# Patient Record
Sex: FEMALE | Race: BLACK OR AFRICAN AMERICAN | Employment: FULL TIME | URBAN - METROPOLITAN AREA
[De-identification: names, ages, dates, MRNs, and addresses within clinical notes are randomized per-mention and may not be internally consistent; named-entity substitution may affect disease eponyms.]

---

## 2019-02-15 ENCOUNTER — OFFICE VISIT (OUTPATIENT)
Dept: ORTHOPEDIC SURGERY | Age: 34
End: 2019-02-15

## 2019-02-15 VITALS
DIASTOLIC BLOOD PRESSURE: 73 MMHG | HEART RATE: 55 BPM | SYSTOLIC BLOOD PRESSURE: 139 MMHG | TEMPERATURE: 98.3 F | WEIGHT: 194 LBS | BODY MASS INDEX: 31.31 KG/M2 | RESPIRATION RATE: 18 BRPM

## 2019-02-15 DIAGNOSIS — M51.37 DEGENERATION OF LUMBAR OR LUMBOSACRAL INTERVERTEBRAL DISC: ICD-10-CM

## 2019-02-15 DIAGNOSIS — M51.26 DISPLACEMENT OF LUMBAR INTERVERTEBRAL DISC WITHOUT MYELOPATHY: ICD-10-CM

## 2019-02-15 DIAGNOSIS — M47.817 LUMBOSACRAL SPONDYLOSIS WITHOUT MYELOPATHY: ICD-10-CM

## 2019-02-15 DIAGNOSIS — M54.50 LOW BACK PAIN AT MULTIPLE SITES: Primary | ICD-10-CM

## 2019-02-15 RX ORDER — NAPROXEN 500 MG/1
500 TABLET ORAL 2 TIMES DAILY WITH MEALS
Qty: 30 TAB | Refills: 0 | Status: SHIPPED | OUTPATIENT
Start: 2019-02-15 | End: 2021-02-18

## 2019-02-15 RX ORDER — METHYLPREDNISOLONE 4 MG/1
TABLET ORAL
Qty: 1 DOSE PACK | Refills: 0 | Status: SHIPPED | OUTPATIENT
Start: 2019-02-15 | End: 2021-02-18

## 2019-02-15 NOTE — PROGRESS NOTES
[facility]  [address], [address]  Phone: [phone]  Fax: [phone]        INITIAL CONSULTATION      HISTORY OF PRESENT ILLNESS:  Jason Davila is a 34 y.o. female whom is self-referred secondary to intermittent low back pain extending into the RLE in an L4 distribution to the knee. She rates her pain 3-8/10. Pain is exacerbated with prolonged positions. Denies specific injury or trauma. Ho hx of spinal surgery or injections. She treats with NSAIDs prn with some relief. She reports a hx of PT and she is not completing her HEP. Pt denies fever, weight loss, or skin changes. Pt denies change in bowel or bladder habits. She denies possibility of pregnancy or breastfeeding (birth control). Last seen by me 7/2015 with c/o left-sided low back pain and right hip pain. Had an MRI of the right hip. Scheduled to f/u in 1 month and failed to do so. Lumbar spine MRI dated 6/29/15 reviewed. Per report, There is a mild anterolisthesis of L5-S1 with broad-based disc protrusion, central annular tear and subtle caudal extrusion as a result of the listhesis but without significant impression on the ventral thecal sac or canal stenosis. Moderate bilateral foraminal stenosis from foraminal portions of the protrusion. Focal central tear and extrusion at L4-5 with impression on the ventral thecal sac and mild canal stenosis. Exit foramina are patent. Remaining levels are normal.  No cord lesions are seen. .      The patient is RHD.  reviewed. Body mass index is 31.31 kg/m². PCP: Nikhil Omer MD    Past Medical History:   Diagnosis Date    Allergic rhinitis     HPV (human papilloma virus) infection     Migraines         History reviewed. No pertinent surgical history. Social History     Tobacco Use    Smoking status: Never Smoker   Substance Use Topics    Alcohol use: Yes     Comment: socially     Work status: The patient is employed.   Marital status: The patient is single. Current Outpatient Medications   Medication Sig Dispense Refill    ibuprofen (MOTRIN) 600 mg tablet Take 1 Tab by mouth every six (6) hours as needed for Pain. 20 Tab 0    HYDROcodone-acetaminophen (NORCO) 5-325 mg per tablet Take 1 Tab by mouth every four (4) hours as needed for Pain. Max Daily Amount: 6 Tabs. 12 Tab 0    ethinyl estradiol-etonogestrel (NUVARING) 0.12-0.015 mg/24 hr vaginal ring by Intravaginally route. Allergies   Allergen Reactions    Fish Containing Products Hives and Swelling            Family History   Problem Relation Age of Onset   26 [facility] Cancer Mother     Hypertension Mother     Deep Vein Thrombosis Other          REVIEW OF SYSTEMS  Constitutional symptoms: Negative  Eyes: Negative  Ears, Nose, Throat, and Mouth: Negative  Cardiovascular: Negative  Respiratory: Negative  Genitourinary: Negative  Integumentary (Skin and/or breast): Negative  Musculoskeletal: Positive for low back pain RLE pain  Extremities: Negative for edema. Endocrine/Rheumatologic: Negative  Hematologic/Lymphatic: Negative  Allergic/Immunologic: Negative  Psychiatric: Negative       PHYSICAL EXAMINATION  Visit Vitals  /73   Pulse (!) 55   Temp 98.3 °F (36.8 °C)   Resp 18   Wt 194 lb (88 kg)   BMI 31.31 kg/m²       CONSTITUTIONAL: NAD, A&O x 3  HEART: Regular rate and rhythm  ABDOMEN: Positive bowel sounds, soft, nontender, and nondistended  LUNGS: Clear to auscultation bilaterally. SKIN: Negative for rash. RANGE OF MOTION: The patient has full passive range of motion in all four extremities. SENSATION: Sensation is intact to light touch throughout. MOTOR:   Straight Leg Raise: Negative, bilateral  Valenzuela: Negative, bilateral  Deep tendon reflexes are 1+ at the brachioradialis, biceps, and triceps bilaterally  Deep tendon reflexes are 0 at the knees and ankles bilaterally.      Shoulder AB/Flex Elbow Flex Wrist Ext Elbow Ext Wrist Flex Hand Intrin Tone   Right +4/5 +4/5 +4/5 +4/5 +4/5 +4/5 +4/5   Left +4/5 +4/5 +4/5 +4/5 +4/5 +4/5 +4/5              Hip Flex Knee Ext Knee Flex Ankle DF GTE Ankle PF Tone   Right +4/5 +4/5 +4/5 +4/5 +4/5 +4/5 +4/5   Left +4/5 +4/5 +4/5 +4/5 +4/5 +4/5 +4/5     RADIOGRAPHS  Preliminary reading of lumbar plain films:  Grade I anterolisthesis of L5 on S1. Questionable Pars defect on the right at L5. nodefinite Pars defect on the left. Age appropriate degenerative changes. No acute pathology identified. No gross instability noted on flexion extension. These are being sent out for official reading by Dr. Yeni Kieth. ASSESSMENT   Diagnoses and all orders for this visit:    1. Low back pain at multiple sites  -     AMB POC XRAY, SPINE, LUMBOSACRAL; 2 O  -     AMB POC XRAY, SPINE, LUMBOSACRAL; 4+    2. Displacement of lumbar intervertebral disc without myelopathy    3. Lumbosacral spondylosis without myelopathy    4. Degeneration of lumbar or lumbosacral intervertebral disc           IMPRESSIONS/RECOMMENDATIONS:  Patient presents today with c/o intermittent low back pain extending into the RLE in an L4 distribution to the knee. I will refer her to physical therapy with an emphasis on HEP. I will start her on Medrol Dosepak. I gave her a prescription for Naproxen following completion of the Medrol Dosepak. I will see the patient back in 1 month's time or earlier if needed. Written by Miles Alves, as dictated by Briseida Chavez MD  I examined the patient, reviewed and agree with the note.

## 2019-02-20 ENCOUNTER — TELEPHONE (OUTPATIENT)
Dept: ORTHOPEDIC SURGERY | Age: 34
End: 2019-02-20

## 2019-02-20 NOTE — TELEPHONE ENCOUNTER
Patient called stating her prescriptions naproxen (NAPROSYN) 500 mg tablet and methylPREDNISolone (MEDROL DOSEPACK) 4 mg tablet were supposed to be sent to the [facility] on [address] in [place]. Please re-send medications to the correct pharmacy and advise patient when completed at [phone].

## 2019-02-26 NOTE — TELEPHONE ENCOUNTER
[facility] to cancel the previous prescriptions, however, the patient had already picked them up.

## 2019-02-27 ENCOUNTER — OFFICE VISIT (OUTPATIENT)
Dept: ORTHOPEDIC SURGERY | Age: 34
End: 2019-02-27

## 2019-02-27 VITALS
BODY MASS INDEX: 30.67 KG/M2 | DIASTOLIC BLOOD PRESSURE: 80 MMHG | WEIGHT: 190.8 LBS | RESPIRATION RATE: 18 BRPM | HEIGHT: 66 IN | HEART RATE: 86 BPM | TEMPERATURE: 98.8 F | SYSTOLIC BLOOD PRESSURE: 135 MMHG | OXYGEN SATURATION: 100 %

## 2019-02-27 DIAGNOSIS — M47.817 LUMBOSACRAL SPONDYLOSIS WITHOUT MYELOPATHY: ICD-10-CM

## 2019-02-27 DIAGNOSIS — M51.37 DEGENERATION OF LUMBAR OR LUMBOSACRAL INTERVERTEBRAL DISC: ICD-10-CM

## 2019-02-27 DIAGNOSIS — M51.26 DISPLACEMENT OF LUMBAR INTERVERTEBRAL DISC WITHOUT MYELOPATHY: Primary | ICD-10-CM

## 2019-02-27 RX ORDER — DULOXETIN HYDROCHLORIDE 30 MG/1
30 CAPSULE, DELAYED RELEASE ORAL DAILY
Qty: 30 CAP | Refills: 1 | Status: SHIPPED | OUTPATIENT
Start: 2019-02-27 | End: 2021-02-18

## 2019-02-27 NOTE — PROGRESS NOTES
[facility]  [address], [address]  Phone: [phone]  Fax: [phone]        PROGRESS NOTE      HISTORY OF PRESENT ILLNESS:  The patient is a 34 y.o. female and was seen today for follow up of intermittent low back pain extending into the RLE in an L4 distribution to the knee. Pain is exacerbated with prolonged positions. Denies specific injury or trauma. Ho hx of spinal surgery or injections. She treats with NSAIDs prn with some relief. She reports a hx of PT and she is not completing her HEP. Pt denies fever, weight loss, or skin changes. Pt denies change in bowel or bladder habits. She denies possibility of pregnancy or breastfeeding (birth control). The patient is RHD. Last seen by me 7/2015 with c/o left-sided low back pain and right hip pain. Had an MRI of the right hip. Scheduled to f/u in 1 month and failed to do so. Lumbar spine MRI dated 6/29/15 reviewed. Per report, There is a mild anterolisthesis of L5-S1 with broad-based disc protrusion, central annular tear and subtle caudal extrusion as a result of the listhesis but without significant impression on the ventral thecal sac or canal stenosis. Moderate bilateral foraminal stenosis from foraminal portions of the protrusion. Focal central tear and extrusion at L4-5 with impression on the ventral thecal sac and mild canal stenosis. Exit foramina are patent. Remaining levels are normal.  No cord lesions are seen. Preliminary reading of lumbar plain films revealed: Grade I anterolisthesis of L5 on S1. Questionable Pars defect on the right at L5. nodefinite Pars defect on the left. Age appropriate degenerative changes. No acute pathology identified. No gross instability noted on flexion extension. At her last clinical appointment, patient presented with c/o intermittent low back pain extending into the RLE in an L4 distribution to the knee. I referred her to physical therapy with an emphasis on HEP.  I started her on Medrol Dosepak. I gave her a prescription for Naproxen following completion of the Medrol Dosepak. The patient returns today with c/o intermittent low back pain extending into the BLE in an L4 distribution to the knees. She rates her pain 8/10, previously 3-8/10. She just started MDP 3 days ago due to a delay at her pharmacy. She is scheduled to start PT tomorrow. Pt denies change in bowel or bladder habits.  reviewed. Body mass index is 30.8 kg/m².       PCP: Carolyn Vides MD      Past Medical History:   Diagnosis Date    Allergic rhinitis     HPV (human papilloma virus) infection     Migraines         Social History     Socioeconomic History    Marital status: SINGLE     Spouse name: Not on file    Number of children: Not on file    Years of education: Not on file    Highest education level: Not on file   Social Needs    Financial resource strain: Not on file    Food insecurity - worry: Not on file    Food insecurity - inability: Not on file    Transportation needs - medical: Not on file   [facility] needs - non-medical: Not on file   Occupational History    Not on file   Tobacco Use    Smoking status: Never Smoker    Smokeless tobacco: Never Used   Substance and Sexual Activity    Alcohol use: Yes     Comment: socially    Drug use: No    Sexual activity: Yes     Partners: Male   Other Topics Concern     Service Not Asked    Blood Transfusions Not Asked    Caffeine Concern Not Asked    Occupational Exposure Not Asked    Hobby Hazards Not Asked    Sleep Concern Not Asked    Stress Concern Not Asked    Weight Concern Not Asked    Special Diet Not Asked    Back Care Not Asked    Exercise Not Asked    Bike Helmet Not Asked    Seat Belt Not Asked    Self-Exams Not Asked   Social History Narrative    Not on file       Current Outpatient Medications   Medication Sig Dispense Refill    methylPREDNISolone (MEDROL DOSEPACK) 4 mg tablet Per dose pack instructions 1 Dose Pack 0    ethinyl estradiol-etonogestrel (NUVARING) 0.12-0.015 mg/24 hr vaginal ring by Intravaginally route.  naproxen (NAPROSYN) 500 mg tablet Take 1 Tab by mouth two (2) times daily (with meals). 30 Tab 0    ibuprofen (MOTRIN) 600 mg tablet Take 1 Tab by mouth every six (6) hours as needed for Pain. 20 Tab 0    HYDROcodone-acetaminophen (NORCO) 5-325 mg per tablet Take 1 Tab by mouth every four (4) hours as needed for Pain. Max Daily Amount: 6 Tabs. 12 Tab 0       Allergies   Allergen Reactions    Fish Containing Products Hives and Swelling          PHYSICAL EXAMINATION    Visit Vitals  /80   Pulse 86   Temp 98.8 °F (37.1 °C) (Oral)   Resp 18   Ht 5' 6\" (1.676 m)   Wt 190 lb 12.8 oz (86.5 kg)   SpO2 100%   BMI 30.80 kg/m²       CONSTITUTIONAL: NAD, A&O x 3  SENSATION: Intact to light touch throughout  RANGE OF MOTION: The patient has full passive range of motion in all four extremities. MOTOR:  Straight Leg Raise: Negative, bilateral               Hip Flex Knee Ext Knee Flex Ankle DF GTE Ankle PF Tone   Right +4/5 +4/5 +4/5 +4/5 +4/5 +4/5 +4/5   Left +4/5 +4/5 +4/5 +4/5 +4/5 +4/5 +4/5       ASSESSMENT   Diagnoses and all orders for this visit:    1. Displacement of lumbar intervertebral disc without myelopathy    2. Degeneration of lumbar or lumbosacral intervertebral disc    3. Lumbosacral spondylosis without myelopathy    Other orders  -     DULoxetine (CYMBALTA) 30 mg capsule; Take 1 Cap by mouth daily.  -     SCHEDULE SURGERY          IMPRESSION AND PLAN:   Patient presents today with c/o intermittent low back pain extending into the BLE in an L4 distribution to the knees. Patient wishes to proceed with block at this time. I will order L3-4 epidural. I have instructed patient not to take Naproxen following MDP. I will try her on Cymbalta 30 mg daily. The risks, benefits, and potential side effects of this medication were discussed.  Patient understands and wishes to proceed. Patient advised to call the office if intolerant to new medication. Patient is not interested in surgical intervention at this time. Patient is neurologically intact. I will see the patient back following blocks. Written by Edith Price, as dictated by Caridad Garcia MD  I examined the patient, reviewed and agree with the note.

## 2019-02-28 ENCOUNTER — APPOINTMENT (OUTPATIENT)
Dept: GENERAL RADIOLOGY | Age: 34
End: 2019-02-28
Attending: PHYSICAL MEDICINE & REHABILITATION
Payer: COMMERCIAL

## 2019-02-28 ENCOUNTER — HOSPITAL ENCOUNTER (OUTPATIENT)
Age: 34
Setting detail: OUTPATIENT SURGERY
Discharge: HOME | End: 2019-02-28
Attending: PHYSICAL MEDICINE & REHABILITATION | Admitting: PHYSICAL MEDICINE & REHABILITATION
Payer: COMMERCIAL

## 2019-02-28 ENCOUNTER — APPOINTMENT (OUTPATIENT)
Dept: PHYSICAL THERAPY | Age: 34
End: 2019-02-28

## 2019-02-28 VITALS
DIASTOLIC BLOOD PRESSURE: 85 MMHG | TEMPERATURE: 98 F | OXYGEN SATURATION: 98 % | BODY MASS INDEX: 30.53 KG/M2 | RESPIRATION RATE: 18 BRPM | WEIGHT: 190 LBS | HEART RATE: 72 BPM | HEIGHT: 66 IN | SYSTOLIC BLOOD PRESSURE: 127 MMHG

## 2019-02-28 LAB — HCG UR QL: NEGATIVE

## 2019-02-28 PROCEDURE — 81025 URINE PREGNANCY TEST: CPT

## 2019-02-28 PROCEDURE — 76010000009 HC PAIN MGT 0 TO 30 MIN PROC: Performed by: PHYSICAL MEDICINE & REHABILITATION

## 2019-02-28 PROCEDURE — 74011250636 HC RX REV CODE- 250/636: Performed by: PHYSICAL MEDICINE & REHABILITATION

## 2019-02-28 PROCEDURE — 74011250637 HC RX REV CODE- 250/637: Performed by: PHYSICAL MEDICINE & REHABILITATION

## 2019-02-28 PROCEDURE — 77030014124 HC TY EPDRL BBMI -A: Performed by: PHYSICAL MEDICINE & REHABILITATION

## 2019-02-28 RX ORDER — LIDOCAINE HYDROCHLORIDE 10 MG/ML
INJECTION, SOLUTION EPIDURAL; INFILTRATION; INTRACAUDAL; PERINEURAL AS NEEDED
Status: DISCONTINUED | OUTPATIENT
Start: 2019-02-28 | End: 2019-02-28 | Stop reason: HOSPADM

## 2019-02-28 RX ORDER — DIAZEPAM 5 MG/1
5-20 TABLET ORAL ONCE
Status: COMPLETED | OUTPATIENT
Start: 2019-02-28 | End: 2019-02-28

## 2019-02-28 RX ORDER — DEXAMETHASONE SODIUM PHOSPHATE 100 MG/10ML
INJECTION INTRAMUSCULAR; INTRAVENOUS AS NEEDED
Status: DISCONTINUED | OUTPATIENT
Start: 2019-02-28 | End: 2019-02-28 | Stop reason: HOSPADM

## 2019-02-28 RX ADMIN — DIAZEPAM 10 MG: 5 TABLET ORAL at 12:36

## 2019-02-28 NOTE — PROCEDURES
Intralaminar Epidural Steroid Block Procedure Note      Patient Name: Jimmie Serra    Date of Procedure: February 28, 2019    Preoperative Diagnosis: HNP (herniated nucleus pulposus), lumbar [M51.26]  Spondylosis of lumbosacral region, unspecified spinal osteoarthritis complication status [U24.583]    Post Operative Diagnosis: HNP (herniated nucleus pulposus), lumbar [M51.26]  Spondylosis of lumbosacral region, unspecified spinal osteoarthritis complication status [V56.832]    Procedure: L3-L4 Epidural Block     PROCEDURE:  Lumbar Epidural Block    Consent:  Informed  Consent was obtained prior to the procedure. The patient was given the opportunity to ask questions regarding the procedure and its associated risks. In addition to the potential risks associated with the procedure itself, the patient was informed both verbally and in writing of potential side effects of the use glucocorticoids. The patient appeared to comprehend the informed consent and desired to have the procedure performed. The patient was placed in the prone position on the fluoroscopy table and the back prepped and draped in the usual sterile manner. The interlaminar space was identified using fluoroscopy and the skin anesthetized with 1% Lidocaine. A #18 Tuohy Epidural needle was advanced under fluoroscopic control from a paramedian approach to the  epidural space as noted above, using the loss of resistance to fluid technique. Then, 30 mg of preservative free Dexamethasone and 4 cc of Lidocaine was slowly injected. The patient tolerated the procedure well. The injection area was cleaned and band aids applied. No excessive bleeding was noted. Patient dressed and was discharged to home with instructions.

## 2019-02-28 NOTE — DISCHARGE INSTRUCTIONS
[facility]   ([place])  Dr. Diogo Perez, Dr. Jaida Harrington, Dr. Yoselin Hudson not drive a car, operate heavy machinery or dangerous equipment for 24 hours. * Activity as tolerated; rest for the remainder of the day. * Resume pre-block medications including those for your family doctor. * Do not drink alcoholic beverages for 24 hours. Alcohol and the medications you have received may interact and cause an adverse reaction. * You may feel better this evening and worse tomorrow, as the numbing medications wears off and the steroid has yet to begin to work. After 48 hrs the steroid should begin to release bringing you relief. * You may shower this evening and remove any bandages. * Avoid hot tubs and heating pads for 24 hours. You may use cold packs on the procedure site as tolerated for the first 24 hours. * If a headache develops, drink plenty of fluids and rest.  Take over the counter medications for headache if needed. If the headache continues longer than 24 hours, call MD at the [address] [place] [address]. [phone]    * Continue taking pain medications as needed. * You may resume your regular diet if tolerated. Otherwise, start with sips of water and advance slowly. * If Diabetic: check your blood sugar three times a day for the next 3 days. If your sugar is greater than 300 call your family doctor. If your sugar is greater than 400, have someone transport you to the nearest Emergency Room. * If you experience any of the following problems, Please Call the [address] [place] [address] at [phone].         * Shortness of Breath    * Fever of 101 or higher    * Nausea / Vomiting    * Severe Headache    * Weakness or numbness in arms or legs that is not      resolving    * Prolonged increase in pain greater than 4 days      DISCHARGE SUMMARY from Nurse      PATIENT INSTRUCTIONS:    After oral sedation, for 24 hours or while taking prescription Narcotics:  · Limit your activities  · Do not drive and operate hazardous machinery  · Do not make important personal or business decisions  · Do  not drink alcoholic beverages  · If you have not urinated within 8 hours after discharge, please contact your surgeon on call. Report the following to your surgeon:  · Excessive pain, swelling, redness or odor of or around the surgical area  · Temperature over 101  · Nausea and vomiting lasting longer than 4 hours or if unable to take medications  · Any signs of decreased circulation or nerve impairment to extremity: change in color, persistent  numbness, tingling, coldness or increase pain  · Any questions            What to do at Home:  Recommended activity: Activity as tolerated, NO DRIVING FOR 12 Hours post injection          *  Please give a list of your current medications to your Primary Care Provider. *  Please update this list whenever your medications are discontinued, doses are      changed, or new medications (including over-the-counter products) are added. *  Please carry medication information at all times in case of emergency situations. These are general instructions for a healthy lifestyle:    No smoking/ No tobacco products/ Avoid exposure to second hand smoke    Surgeon General's Warning:  Quitting smoking now greatly reduces serious risk to your health. Obesity, smoking, and sedentary lifestyle greatly increases your risk for illness    A healthy diet, regular physical exercise & weight monitoring are important for maintaining a healthy lifestyle    You may be retaining fluid if you have a history of heart failure or if you experience any of the following symptoms:  Weight gain of 3 pounds or more overnight or 5 pounds in a week, increased swelling in our hands or feet or shortness of breath while lying flat in bed.   Please call your doctor as soon as you notice any of these symptoms; do not wait until your next office visit. Recognize signs and symptoms of STROKE:    F-face looks uneven    A-arms unable to move or move unevenly    S-speech slurred or non-existent    T-time-call 911 as soon as signs and symptoms begin-DO NOT go       Back to bed or wait to see if you get better-TIME IS BRAIN.

## 2019-03-04 ENCOUNTER — TELEPHONE (OUTPATIENT)
Dept: ORTHOPEDIC SURGERY | Age: 34
End: 2019-03-04

## 2019-03-04 NOTE — TELEPHONE ENCOUNTER
Patient called for Mary Beth Haney. Patient said that she is not taking well to the Duloxetine medication that was prescribed. Patient said that the medication does not make her feel good at all. That she can not eat. That she is not reacting right to it. That she stopped taking the medication Saturday. [facility] [address]. [phone]. Patient tel. [phone]. Ari Chowdhury

## 2019-03-04 NOTE — TELEPHONE ENCOUNTER
She can try topamax 25 mg 1 tab po q hs x 5 days, then 2 tabs po qhs x 5 days then 3 tabs po qhs #90

## 2019-03-05 RX ORDER — TOPIRAMATE 25 MG/1
TABLET ORAL
Qty: 90 TAB | Refills: 1 | Status: SHIPPED | OUTPATIENT
Start: 2019-03-05 | End: 2019-03-05 | Stop reason: CLARIF

## 2019-03-05 NOTE — TELEPHONE ENCOUNTER
There is only an interaction with 200 mg or more of the topamax per day, but if she can change to neurontin 100 mg q hs which is very low dose, but we can go up if needed and she tolerates it.   #30

## 2019-03-08 RX ORDER — GABAPENTIN 100 MG/1
100 CAPSULE ORAL
Qty: 30 CAP | Refills: 1 | Status: SHIPPED | OUTPATIENT
Start: 2019-03-08 | End: 2021-02-18

## 2019-03-08 NOTE — TELEPHONE ENCOUNTER
Called patient but did not get an answer. I will send the Neurontin in to the pharmacy as noted in previous message.

## 2019-03-15 ENCOUNTER — OFFICE VISIT (OUTPATIENT)
Dept: ORTHOPEDIC SURGERY | Age: 34
End: 2019-03-15

## 2019-03-15 VITALS
SYSTOLIC BLOOD PRESSURE: 113 MMHG | DIASTOLIC BLOOD PRESSURE: 75 MMHG | OXYGEN SATURATION: 99 % | HEIGHT: 66 IN | BODY MASS INDEX: 30.7 KG/M2 | HEART RATE: 70 BPM | WEIGHT: 191 LBS

## 2019-03-15 DIAGNOSIS — M51.26 DISPLACEMENT OF LUMBAR INTERVERTEBRAL DISC WITHOUT MYELOPATHY: Primary | ICD-10-CM

## 2019-03-15 DIAGNOSIS — M47.817 LUMBOSACRAL SPONDYLOSIS WITHOUT MYELOPATHY: ICD-10-CM

## 2019-03-15 NOTE — PROGRESS NOTES
Chief Complaint   Patient presents with    Back Pain     low back pain     1. Have you been to the ER, urgent care clinic since your last visit? Hospitalized since your last visit? No    2. Have you seen or consulted any other health care providers outside of the [address] [place] [address] since your last visit? Include any pap smears or colon screening.  No

## 2019-03-15 NOTE — PROGRESS NOTES
[facility]  [address], [address]  Phone: [phone]  Fax: [phone]        PROGRESS NOTE      HISTORY OF PRESENT ILLNESS:  The patient is a 34 y.o. female and was seen today for follow up of intermittent low back pain extending into the BLE in an L4 distribution to the knees. She was initially seen with intermittent low back pain extending into the RLE in an L4 distribution to the knee. Pain is exacerbated with prolonged positions. Denies specific injury or trauma. Ho hx of spinal surgery or injections. She treats with NSAIDs prn with some relief. She reports a hx of PT and she is not completing her HEP. Pt denies fever, weight loss, or skin changes. Pt denies change in bowel or bladder habits. She denies possibility of pregnancy or breastfeeding (birth control). The patient is RHD. Last seen by me 7/2015 with c/o left-sided low back pain and right hip pain. Had an MRI of the right hip. Scheduled to f/u in 1 month and failed to do so. Lumbar spine MRI dated 6/29/15 reviewed. Per report, There is a mild anterolisthesis of L5-S1 with broad-based disc protrusion, central annular tear and subtle caudal extrusion as a result of the listhesis but without significant impression on the ventral thecal sac or canal stenosis. Moderate bilateral foraminal stenosis from foraminal portions of the protrusion. Focal central tear and extrusion at L4-5 with impression on the ventral thecal sac and mild canal stenosis. Exit foramina are patent. Remaining levels are normal.  No cord lesions are seen. Preliminary reading of lumbar plain films revealed: Grade I anterolisthesis of L5 on S1. Questionable Pars defect on the right at L5. nodefinite Pars defect on the left. Age appropriate degenerative changes. No acute pathology identified. No gross instability noted on flexion extension.  At her last clinical appointment, patient presented with c/o intermittent low back pain extending into the BLE in an L4 distribution to the knees. Patient wished to proceed with block at this time. I ordered L3-4 epidural. I instructed patient not to take Naproxen following MDP. I tried her on Cymbalta 30 mg daily. The patient returns today with pain location and distribution remain unchanged. She rates her pain 0/10, previously 8/10. Pt did not tolerate Cymbalta 30 mg daily, as it was causing her do have decreased appetite. Pt has not started PT as her first session was scheduled for the same day as the injection. Pt denies change in bowel or bladder habits. Pt underwent L3-4 epidural on 2/28/19 with significant relief.  reviewed. Body mass index is 30.83 kg/m².       PCP: Becki Perez MD      Past Medical History:   Diagnosis Date    Allergic rhinitis     HPV (human papilloma virus) infection     Migraines         Social History     Socioeconomic History    Marital status: SINGLE     Spouse name: Not on file    Number of children: Not on file    Years of education: Not on file    Highest education level: Not on file   Social Needs    Financial resource strain: Not on file    Food insecurity - worry: Not on file    Food insecurity - inability: Not on file    Transportation needs - medical: Not on file   [employer] needs - non-medical: Not on file   Occupational History    Not on file   Tobacco Use    Smoking status: Never Smoker    Smokeless tobacco: Never Used   Substance and Sexual Activity    Alcohol use: Yes     Comment: socially    Drug use: No    Sexual activity: Yes     Partners: Male   Other Topics Concern     Service Not Asked    Blood Transfusions Not Asked    Caffeine Concern Not Asked    Occupational Exposure Not Asked    Hobby Hazards Not Asked    Sleep Concern Not Asked    Stress Concern Not Asked    Weight Concern Not Asked    Special Diet Not Asked    Back Care Not Asked    Exercise Not Asked    Bike Helmet Not Asked  Seat Belt Not Asked    Self-Exams Not Asked   Social History Narrative    Not on file       Current Outpatient Medications   Medication Sig Dispense Refill    ethinyl estradiol-etonogestrel (NUVARING) 0.12-0.015 mg/24 hr vaginal ring by Intravaginally route.  gabapentin (NEURONTIN) 100 mg capsule Take 1 Cap by mouth nightly. 30 Cap 1    DULoxetine (CYMBALTA) 30 mg capsule Take 1 Cap by mouth daily. 30 Cap 1    methylPREDNISolone (MEDROL DOSEPACK) 4 mg tablet Per dose pack instructions 1 Dose Pack 0    naproxen (NAPROSYN) 500 mg tablet Take 1 Tab by mouth two (2) times daily (with meals). 30 Tab 0    ibuprofen (MOTRIN) 600 mg tablet Take 1 Tab by mouth every six (6) hours as needed for Pain. 20 Tab 0    HYDROcodone-acetaminophen (NORCO) 5-325 mg per tablet Take 1 Tab by mouth every four (4) hours as needed for Pain. Max Daily Amount: 6 Tabs. 12 Tab 0       Allergies   Allergen Reactions    Fish Containing Products Hives and Swelling          PHYSICAL EXAMINATION    Visit Vitals  /75   Pulse 70   Ht 5' 6\" (1.676 m)   Wt 191 lb (86.6 kg)   SpO2 99%   BMI 30.83 kg/m²       CONSTITUTIONAL: NAD, A&O x 3  SENSATION: Intact to light touch throughout  RANGE OF MOTION: The patient has full passive range of motion in all four extremities. MOTOR:  Straight Leg Raise: Negative, bilateral               Hip Flex Knee Ext Knee Flex Ankle DF GTE Ankle PF Tone   Right +4/5 +4/5 +4/5 +4/5 +4/5 +4/5 +4/5   Left +4/5 +4/5 +4/5 +4/5 +4/5 +4/5 +4/5       ASSESSMENT   Diagnoses and all orders for this visit:    1. Displacement of lumbar intervertebral disc without myelopathy    2. Lumbosacral spondylosis without myelopathy          IMPRESSION AND PLAN:  Patient is s/p L3-4 epidural noting significant relief. She presents today with no pain complaints. I recommend she attend PT as prescribed and perform her HEP daily following that.  The patient does not think her remaining pain complaints are severe enough to warrant additional workup/treatment at this time. Patient is neurologically intact. I will see the patient back prn. Written by Miles Alves, as dictated by Briseida Chavez MD  I examined the patient, reviewed and agree with the note.

## 2020-08-28 NOTE — PROGRESS NOTES
[facility]  [address], [address]   Phone: [phone]  Fax: [phone]        PROGRESS NOTE      HISTORY OF PRESENT ILLNESS:  The patient is a 30 y.o. female and was seen today for follow up of intermittent low back pain radiating into the BLE in an L4 distribution to the knees.  She was initially seen with intermittent low back pain radiating into the RLE in an L4 distribution to the knee. Pain is exacerbated with prolonged positions. Denies specific injury or trauma. Denies hx of spinal surgery. She treats with NSAIDs prn with some relief. She reports a hx of PT and she is not completing her HEP. Pt did not tolerate CYMBALTA 30 mg daily, as it was causing her do have decreased appetite. Pt underwent L3-4 epidural on 2/28/19 with significant relief. Pt denies fever, weight loss, or skin changes. Pt denies change in bowel or bladder habits. She denies possibility of pregnancy or breastfeeding (birth control). The patient is RHD. Last seen by me 7/2015 with c/o left-sided low back pain and right hip pain. Had an MRI of the right hip. Scheduled to f/u in 1 month and failed to do so. Lumbar spine MRI dated 6/29/15 reviewed. Per report, there is a mild anterolisthesis of L5-S1 with broad-based disc protrusion, central annular tear and subtle caudal extrusion as a result of the listhesis but without significant impression on the ventral thecal sac or canal stenosis. Moderate bilateral foraminal stenosis from foraminal portions of the protrusion. Focal central tear and extrusion at L4-5 with impression on the ventral thecal sac and mild canal stenosis.  Exit foramina are patent. Remaining levels are normal.  No cord lesions are seen. Preliminary reading of lumbar plain films revealed: Grade I anterolisthesis of L5 on S1. Questionable Pars defect on the right at L5. nodefinite Pars defect on the left. Age appropriate degenerative changes.  No acute pathology identified. No gross instability noted on flexion extension. At her last clinical appointment, I recommended she attend PT as prescribed and perform her HEP daily following that. The patient did not think her remaining pain complaints were severe enough to warrant additional workup/treatment at the time.       The patient returns today with low back pain radiating into the RLE in a L4 distribution to the knee. She rates her pain 1/10, previously 0/10. Pt was last seen by in 3/2019. Her pain was tolerable until a couple weeks ago when she had a progression in pain without trauma. Her pain has improved over the past week. Pt did not complete PT. Pt denies change in bowel or bladder habits. She denies possibility of pregnancy or breastfeeding.  reviewed. Body mass index is 32.54 kg/m².     PCP: Mady Garcia MD      Past Medical History:   Diagnosis Date    Allergic rhinitis     Eczema     HPV (human papilloma virus) infection     Migraines         Social History     Socioeconomic History    Marital status: SINGLE     Spouse name: Not on file    Number of children: Not on file    Years of education: Not on file    Highest education level: Not on file   Occupational History    Not on file   Social Needs    Financial resource strain: Not on file    Food insecurity     Worry: Not on file     Inability: Not on file    Transportation needs     Medical: Not on file     Non-medical: Not on file   Tobacco Use    Smoking status: Never Smoker    Smokeless tobacco: Never Used   Substance and Sexual Activity    Alcohol use: Yes     Comment: socially    Drug use: No    Sexual activity: Yes     Partners: Male   Lifestyle    Physical activity     Days per week: Not on file     Minutes per session: Not on file    Stress: Not on file   Relationships    Social connections     Talks on phone: Not on file     Gets together: Not on file     Attends Mu-ism service: Not on file     Active member of club or organization: Not on file     Attends meetings of clubs or organizations: Not on file     Relationship status: Not on file    Intimate partner violence     Fear of current or ex partner: Not on file     Emotionally abused: Not on file     Physically abused: Not on file     Forced sexual activity: Not on file   Other Topics Concern     Service Not Asked    Blood Transfusions Not Asked    Caffeine Concern Not Asked    Occupational Exposure Not Asked   [place] Hazards Not Asked    Sleep Concern Not Asked    Stress Concern Not Asked    Weight Concern Not Asked    Special Diet Not Asked    Back Care Not Asked    Exercise Not Asked    Bike Helmet Not Asked   [address] Not Asked    Self-Exams Not Asked   Social History Narrative    Not on file       Current Outpatient Medications   Medication Sig Dispense Refill    ethinyl estradiol-etonogestrel (NUVARING) 0.12-0.015 mg/24 hr vaginal ring by Intravaginally route.  gabapentin (NEURONTIN) 100 mg capsule Take 1 Cap by mouth nightly. 30 Cap 1    DULoxetine (CYMBALTA) 30 mg capsule Take 1 Cap by mouth daily. 30 Cap 1    methylPREDNISolone (MEDROL DOSEPACK) 4 mg tablet Per dose pack instructions 1 Dose Pack 0    naproxen (NAPROSYN) 500 mg tablet Take 1 Tab by mouth two (2) times daily (with meals). 30 Tab 0    ibuprofen (MOTRIN) 600 mg tablet Take 1 Tab by mouth every six (6) hours as needed for Pain. 20 Tab 0    HYDROcodone-acetaminophen (NORCO) 5-325 mg per tablet Take 1 Tab by mouth every four (4) hours as needed for Pain. Max Daily Amount: 6 Tabs.  12 Tab 0       Allergies   Allergen Reactions    Fish Containing Products Hives and Swelling          PHYSICAL EXAMINATION    Visit Vitals  /80 (BP 1 Location: Left arm, BP Patient Position: Sitting)   Pulse 75   Temp 98.2 °F (36.8 °C) (Temporal)   Wt 201 lb 9.6 oz (91.4 kg)   BMI 32.54 kg/m²       CONSTITUTIONAL: NAD, A&O x 3  SENSATION: Intact to light touch throughout  RANGE OF MOTION: The patient has full passive range of motion in all four extremities. MOTOR:  Straight Leg Raise: Negative, bilateral                 Hip Flex Knee Ext Knee Flex Ankle DF GTE Ankle PF Tone   Right +4/5 +4/5 +4/5 +4/5 +4/5 +4/5 +4/5   Left +4/5 +4/5 +4/5 +4/5 +4/5 +4/5 +4/5       ASSESSMENT   Diagnoses and all orders for this visit:    1. Displacement of lumbar intervertebral disc without myelopathy    2. Lumbosacral spondylosis without myelopathy      IMPRESSION AND PLAN:  Patient returns to the office today with c/o low back pain radiating into the RLE in a L4 distribution to the knee. Multiple treatment options were discussed. She is not interested in surgical intervention, blocks, or neuropathic medication at this time. I recommended she increase the frequency of HEP to daily. Patient is neurologically intact. I will see the patient back in 6 week's. Written by Sal Clifton, as dictated by Noman Pierre MD  I examined the patient, reviewed and agree with the note.

## 2020-08-31 ENCOUNTER — OFFICE VISIT (OUTPATIENT)
Dept: ORTHOPEDIC SURGERY | Age: 35
End: 2020-08-31

## 2020-08-31 VITALS
WEIGHT: 201.6 LBS | HEART RATE: 75 BPM | DIASTOLIC BLOOD PRESSURE: 80 MMHG | TEMPERATURE: 98.2 F | BODY MASS INDEX: 32.54 KG/M2 | SYSTOLIC BLOOD PRESSURE: 133 MMHG

## 2020-08-31 DIAGNOSIS — M51.26 DISPLACEMENT OF LUMBAR INTERVERTEBRAL DISC WITHOUT MYELOPATHY: Primary | ICD-10-CM

## 2020-08-31 DIAGNOSIS — M47.817 LUMBOSACRAL SPONDYLOSIS WITHOUT MYELOPATHY: ICD-10-CM

## 2020-08-31 NOTE — LETTER
8/31/20 Patient: Adryan Archuleta YOB: 1985 Date of Visit: 8/31/2020 Vic Perez MD 
[address] [facility] [address] [address] VIA Facsimile: [phone] Dear Vic Perez MD, Thank you for referring Ms. Marlon Sharma to [address] for evaluation. My notes for this consultation are attached. If you have questions, please do not hesitate to call me. I look forward to following your patient along with you. Sincerely, Rai Betts MD

## 2022-08-31 ENCOUNTER — OFFICE VISIT (OUTPATIENT)
Dept: ORTHOPEDIC SURGERY | Age: 37
End: 2022-08-31
Payer: COMMERCIAL

## 2022-08-31 VITALS
HEART RATE: 126 BPM | BODY MASS INDEX: 28.73 KG/M2 | TEMPERATURE: 98.6 F | OXYGEN SATURATION: 100 % | RESPIRATION RATE: 17 BRPM | WEIGHT: 178 LBS

## 2022-08-31 DIAGNOSIS — M43.10 SPONDYLOLISTHESIS, ACQUIRED: ICD-10-CM

## 2022-08-31 DIAGNOSIS — Q76.2 SPONDYLOLISTHESIS, CONGENITAL: ICD-10-CM

## 2022-08-31 DIAGNOSIS — M47.817 LUMBOSACRAL SPONDYLOSIS WITHOUT MYELOPATHY: ICD-10-CM

## 2022-08-31 DIAGNOSIS — M54.16 LUMBAR NEURITIS: ICD-10-CM

## 2022-08-31 DIAGNOSIS — M54.50 LUMBAR PAIN: Primary | ICD-10-CM

## 2022-08-31 PROCEDURE — 99214 OFFICE O/P EST MOD 30 MIN: CPT | Performed by: PHYSICAL MEDICINE & REHABILITATION

## 2022-08-31 PROCEDURE — 72100 X-RAY EXAM L-S SPINE 2/3 VWS: CPT | Performed by: PHYSICAL MEDICINE & REHABILITATION

## 2022-08-31 RX ORDER — NAPROXEN 500 MG/1
500 TABLET ORAL 2 TIMES DAILY WITH MEALS
Qty: 30 TABLET | Refills: 0 | Status: SHIPPED | OUTPATIENT
Start: 2022-08-31

## 2022-08-31 RX ORDER — METHYLPREDNISOLONE 4 MG/1
TABLET ORAL
Qty: 1 DOSE PACK | Refills: 0 | Status: SHIPPED | OUTPATIENT
Start: 2022-08-31

## 2022-08-31 NOTE — LETTER
8/31/2022    Patient: Christy Roper   YOB: 1985   Date of Visit: 8/31/2022     Bharti Roper MD  [address] [place] [phone]  Via Fax: [phone]    Dear Bharti Roper MD,      Thank you for referring Ms. Christy Roper to [address] for evaluation. My notes for this consultation are attached. If you have questions, please do not hesitate to call me. I look forward to following your patient along with you.       Sincerely,    Guillermo Painter MD

## 2022-08-31 NOTE — PROGRESS NOTES
[facility]  [address], [address]  Phone: [phone]  Fax: [phone]        PROGRESS NOTE      HISTORY OF PRESENT ILLNESS:  The patient is a 31 y.o. female and was seen today for follow up of low back pain radiating into the RLE in a L4 distribution to the knee. Previously seen for intermittent low back pain radiating into the BLE in an L4 distribution to the knees. She was initially seen with intermittent low back pain radiating into the RLE in an L4 distribution to the knee. Pain is exacerbated with prolonged positions. Denies specific injury or trauma. Denies hx of spinal surgery. She treats with NSAIDs prn with some relief. She reports a hx of PT and she is not completing her HEP. Pt did not tolerate CYMBALTA 30 mg daily, as it was causing her do have decreased appetite. Pt underwent L3-4 epidural on 2/28/19 with significant relief. Pt denies fever, weight loss, or skin changes. Pt denies change in bowel or bladder habits. She denies possibility of pregnancy or breastfeeding (birth control). The patient is RHD. Last seen by me 7/2015 with c/o left-sided low back pain and right hip pain. Had an MRI of the right hip. Scheduled to f/u in 1 month and failed to do so. Lumbar spine MRI dated 6/29/15 reviewed. Per report, there is a mild anterolisthesis of L5-S1 with broad-based disc protrusion, central annular tear and subtle caudal extrusion as a result of the listhesis but without significant impression on the ventral thecal sac or canal stenosis. Moderate bilateral foraminal stenosis from foraminal portions of the protrusion. Focal central tear and extrusion at L4-5 with impression on the ventral thecal sac and mild canal stenosis. Exit foramina are patent. Remaining levels are normal.  No cord lesions are seen. Preliminary reading of lumbar plain films dated 3/22/2019 revealed: Grade I anterolisthesis of L5 on S1.  Questionable Pars defect on the right at L5. nodefinite Pars defect on the left. Age appropriate degenerative changes. No acute pathology identified. No gross instability noted on flexion extension. At her last clinical appointment, she was not interested in surgical intervention, blocks, or neuropathic medication at the time. I recommended she increase the frequency of HEP to daily. The pt was last seen by me on 8/31/2020 and cancelled her 10/12/2020 appt. Failed to rescheduled her appt. The patient returns today with an acute increase in right lower quadrant pain and LLE pain in a L4 distribution to the knee x 8/26/2022, pain progressed over the weekend. She rates her pain 2-8/10, previously 1/10. Her pain is increased with lying down. Pt reports she had been doing well up until this Friday. Pt admits her pain had been on and off but nothing major. Pt admits to treating with OTC pain medications and heating pad with some benefit. Reports her  last spinal injections where done by me a couple of years ago. Pt admits to prior PT but is noncompliant with her HEP. Patient denies previous spinal surgery or chiropractic care. Denies DM. PmHx of migraine headaches, tubal ligation (4/2022). Pt denies h/o stomach ulcers or bleeding disorders. Pt denies change in bowel or bladder habits. Pt denies fever, weight loss, or skin changes.  reviewed. Body mass index is 28.73 kg/m².     PCP: Nakul Escobedo MD      Past Medical History:   Diagnosis Date    Allergic rhinitis     Eczema     HPV (human papilloma virus) infection     Ill-defined condition     DESIRES STERILIZATION    Migraines         Social History     Socioeconomic History    Marital status:      Spouse name: Not on file    Number of children: Not on file    Years of education: Not on file    Highest education level: Not on file   Occupational History    Not on file   Tobacco Use    Smoking status: Never    Smokeless tobacco: Never   Substance and Sexual Activity    Alcohol use: Yes     Comment: socially    Drug use: No    Sexual activity: Yes     Partners: Male   Other Topics Concern     Service Not Asked    Blood Transfusions Not Asked    Caffeine Concern Not Asked    Occupational Exposure Not Asked    Hobby Hazards Not Asked    Sleep Concern Not Asked    Stress Concern Not Asked    Weight Concern Not Asked    Special Diet Not Asked    Back Care Not Asked    Exercise Not Asked    Bike Helmet Not Asked    Seat Belt Not Asked    Self-Exams Not Asked   Social History Narrative    Not on file     Social Determinants of Health     Financial Resource Strain: Not on file   Food Insecurity: Not on file   Transportation Needs: Not on file   Physical Activity: Not on file   Stress: Not on file   Social Connections: Not on file   Intimate Partner Violence: Not on file   Housing Stability: Not on file       Current Outpatient Medications   Medication Sig Dispense Refill    fluticasone propionate (FLONASE) 50 mcg/actuation nasal spray 2 Sprays by Both Nostrils route daily. loratadine (CLARITIN) 10 mg tablet Take 10 mg by mouth daily as needed for Allergies. Allergies   Allergen Reactions    Fish Containing Products Hives and Swelling          REVIEW OF SYSTEMS  Constitutional symptoms: Negative  Eyes: Negative  Ears, Nose, Throat, and Mouth: Negative  Cardiovascular: Negative  Respiratory: Negative  Genitourinary: Negative  Integumentary (Skin and/or breast): Negative  Musculoskeletal: Positive for an acute increase in right lower quadrant pain and LLE pain in a L4 distribution to the knee,  Extremities: Negative for edema.   Endocrine/Rheumatologic: Negative  Hematologic/Lymphatic: Negative  Allergic/Immunologic: Negative  Psychiatric: Negative     PHYSICAL EXAMINATION    Visit Vitals  Pulse (!) 126   Temp 98.6 °F (37 °C)   Resp 17   Wt 178 lb (80.7 kg)   SpO2 100%   BMI 28.73 kg/m²       CONSTITUTIONAL: NAD, A&O x 3  SENSATION: Intact to light touch throughout  RANGE OF MOTION: The patient has full passive range of motion in all four extremities. MOTOR:  Straight Leg Raise: Negative, bilateral               Hip Flex Knee Ext Knee Flex Ankle DF GTE Ankle PF Tone   Right +4/5 +4/5 +4/5 +4/5 +4/5 +4/5 +4/5   Left +4/5 +4/5 +4/5 +4/5 +4/5 +4/5 +4/5     RADIOGRAPHS  Lumbar spine plain films dated 8/31/2022. 2 views: AP and lateral. Revealed: There appears to be a pars defect at L5. Disc space is well maintained. Grade 1 anterolisthesis of L5 on S1     ASSESSMENT   Diagnoses and all orders for this visit:    1. Lumbar pain  -     AMB POC XRAY, SPINE, LUMBOSACRAL; 2 O    2. Lumbosacral spondylosis without myelopathy    3. Lumbar neuritis    4. Spondylolisthesis, acquired    5. Spondylolisthesis, congenital      IMPRESSION AND PLAN:  Patient returns to the office today with c/o an acute increase in right lower quadrant pain and LLE pain in a L4 distribution to the knee. Multiple treatment options were discussed. The pt is interested in pursing spinal injection, I will order a new L spine MRI. I advised patient to bring copies of films to next visit. In the interim, I will start her on Medrol Dosepak followed by Naprosyn. Patient is neurologically intact. I will see the patient back in 1 month's time or earlier if needed. Written by Chris Denise, as dictated by Scott Watts MD  I examined the patient, reviewed and agree with the note.

## 2022-09-01 DIAGNOSIS — M54.50 LUMBAR PAIN: ICD-10-CM

## 2022-09-01 DIAGNOSIS — M54.16 LUMBAR NEURITIS: ICD-10-CM

## 2022-09-01 DIAGNOSIS — M43.10 SPONDYLOLISTHESIS, ACQUIRED: ICD-10-CM

## 2022-09-01 DIAGNOSIS — M47.817 LUMBOSACRAL SPONDYLOSIS WITHOUT MYELOPATHY: ICD-10-CM

## 2022-09-08 ENCOUNTER — TELEPHONE (OUTPATIENT)
Dept: ORTHOPEDIC SURGERY | Age: 37
End: 2022-09-08

## 2022-09-08 DIAGNOSIS — M54.16 LUMBAR NEURITIS: ICD-10-CM

## 2022-09-08 DIAGNOSIS — M47.817 LUMBOSACRAL SPONDYLOSIS WITHOUT MYELOPATHY: Primary | ICD-10-CM

## 2022-09-08 DIAGNOSIS — M43.10 SPONDYLOLISTHESIS, ACQUIRED: ICD-10-CM

## 2022-09-08 NOTE — TELEPHONE ENCOUNTER
Kay Hernandez with [facility]  [address] Dept, [phone], called stating our request for MRI Lumbar Spine with [facility] has pended due to lack of physical therapy. Please contact [facility] today to complete a peer to peer review. Phone [phone], Order# [id number], ID# [id number] (Commercial). Thank you.

## 2022-09-08 NOTE — TELEPHONE ENCOUNTER
Spoke to Dr. Bambi Alas in regards to this message and he stated to set the patietn up for physical therapy

## 2022-09-09 NOTE — TELEPHONE ENCOUNTER
Call pt and find out where she wants to go for PT. Let her know this is a requirement of her insurance. Her last PT referral was in 2019 an 6 weeks of PT has to be done within the last 3 months.

## 2022-09-09 NOTE — TELEPHONE ENCOUNTER
Called patient [phone] and verified her name and date of birth. I informed her that her MRI has been denied due to the lack of physical therapy. She stated she does not understand why they would do that because the doctor wanted her to have the MRI to see what is going on with her. She stated she will be calling her insurance to see what the problem is. She stated the doctor wanted a new MRI because it has been 7 years since she had a MRI and she did not have to do physical therapy then. She inquired when did things change. I informed her that yes some insurance companies are requiring a patient have conservative treatment first like medication and physical therapy and in his note there was nothing stated she had any recent physical therapy. She stated she does not understand this if the doctor wants her to have a MRI. She should be able to have the MRI. She stated she is scheduled for this Sunday. I informed her that I would call and cancel it because I hate for her to go and have it done and they charge her for it. She agreed. I then inquired where would she like to go for her physical therapy. Patient stated she lives near [facility]. I put the referral in for [facility]. Patient was still not happy with this situation and stated she will probably call her insurance company on Monday because she is at work currently. I informed her that she should be able to get the number off the back of her insurance card and its the customer service number and ask for the imaging department. Patient inquired if she should keep her appointment on 10/6 with Dr. James Thornton. I informed her yes because hopefully she would have had a couple sessions of physical therapy and hopefully we can order the MRI at that time. Called Lucila Landis at [facility] authorization dept [phone] and informed her to cancel the MRI scheduled for 9/11 due to MRI being denied due to no recent physical therapy.  An order has been put in for physical therapy. No further action needed at this time.

## 2022-09-20 ENCOUNTER — HOSPITAL ENCOUNTER (OUTPATIENT)
Dept: PHYSICAL THERAPY | Age: 37
Discharge: HOME | End: 2022-09-20
Attending: PHYSICAL MEDICINE & REHABILITATION
Payer: COMMERCIAL

## 2022-09-20 PROCEDURE — 97161 PT EVAL LOW COMPLEX 20 MIN: CPT

## 2022-09-20 PROCEDURE — 97110 THERAPEUTIC EXERCISES: CPT

## 2022-09-20 NOTE — PROGRESS NOTES
PT DAILY TREATMENT NOTE     Patient Name: Jose Monk  Date:2022  : 1985  [x]  Patient  Verified  Payor: BLUE CROSS / Plan: [address] [place] [address] / Product Type: PPO /    In time:307pm  Out time:330pm  Total Treatment Time (min): 23  Visit #: 1 of 1    Medicare/BCBS Only   Total Timed Codes (min):  10 1:1 Treatment Time:  23       Treatment Area: Other low back pain [M54.59]  Spondylosis without myelopathy or radiculopathy, lumbosacral region [M47.817]  Radiculopathy, lumbar region [M54.16]    SUBJECTIVE  Pain Level (0-10 scale): 0  Any medication changes, allergies to medications, adverse drug reactions, diagnosis change, or new procedure performed?: [x] No    [] Yes (see summary sheet for update)  Subjective functional status/changes:   [] No changes reported  See eval    OBJECTIVE    13 min [x]Eval                  []Re-Eval       10 min Therapeutic Exercise:  [] See flow sheet : HEP and education on good versus bad exercises for spine (sit ups versus dead bugs)   Rationale: increase strength and improve coordination to improve the patients ability to manage ADLs with reduced pain. With   [] TE   [] TA   [] neuro   [] other: Patient Education: [x] Review HEP    [] Progressed/Changed HEP based on:   [] positioning   [] body mechanics   [] transfers   [] heat/ice application    [] other:      Other Objective/Functional Measures: see eval     Pain Level (0-10 scale) post treatment: 0    ASSESSMENT/Changes in Function: see POC     [x]  See Plan of Care  []  See progress note/recertification  []  See Discharge Summary         Progress towards goals / Updated goals:  Short Term Goals: To be accomplished in 1 treatments:  Pt will demonstrate understanding of appropriate abdominal and gluteal exercises to reduce chronicity of LBP.   Eval: MET    PLAN  []  Upgrade activities as tolerated     []  Continue plan of care  []  Update interventions per flow sheet       [x]  Discharge due to:_  []  Other:_      Jose Byron, PT 9/20/2022  4:18 PM    Future Appointments   Date Time Provider Rajan Preston   10/6/2022 11:30 AM Anny Cui MD [facility]

## 2022-09-20 NOTE — PROGRESS NOTES
[facility]  [address] [facility] [address]  [place], [address]  [phone] fax    Plan of Care/ Statement of Necessity for Physical Therapy Services    Patient name: Amber Huynh Start of Care: 2022   Referral source: Saira Cruz MD : 1985    Medical Diagnosis: Other low back pain [M54.59]  Spondylosis without myelopathy or radiculopathy, lumbosacral region [M47.817]  Radiculopathy, lumbar region [M54.16]  Payor: BLUE CROSS / Plan: [address] [place] [address] / Product Type: PPO /  Onset Date:chronic    Treatment Diagnosis: LBP   Prior Hospitalization: see medical history Provider#: [id number]   Medications: Verified on Patient summary List    Comorbidities: allergies, LBP, childbirth x 1    Prior Level of Function: reports no LBP for a few months     The Plan of Care and following information is based on the information from the initial evaluation. Assessment/ key information: Pt is a 72-year-old woman presenting to the clinic with c/o intermittent LBP that may occur with high severity for a few days and then go away for a couple months or years. She had a flare up of pain two weeks ago where she almost went to the ER and saw her MD for re-assessment. She has no pain today and is unlimited with ADLs. Today, she demonstrates 100% L/S ROM with some hyper-extension of the spine. She demonstrates [facility] BLE strength, with slight gluteal and abdominal weakness. Her flexibility and B hip ROM is WNL. There is no pain upon palpation of her T/S, L/S, sacrum, or hips. Special testing for the hips, L/S, and SIJ are all negative. She has been asymptomatic since her last MD visit. Due to limited impairments and no pain, pt is not a candidate for physical therapy and is recommended to continue with an HEP and follow-up with her referring MD in October. Pt is agreeable to this plan and is discharged from PT.     Evaluation Complexity History LOW Complexity : Zero comorbidities / personal factors that will impact the outcome / POC; Examination LOW Complexity : 1-2 Standardized tests and measures addressing body structure, function, activity limitation and / or participation in recreation  ;Presentation LOW Complexity : Stable, uncomplicated  ;Clinical Decision Making LOW Complexity : FOTO score of   Overall Complexity Rating: LOW   Problem List: decrease strength   Treatment Plan may include any combination of the following: Therapeutic exercise, Therapeutic activities, Neuromuscular re-education, Physical agent/modality, Manual therapy, Patient education, Self Care training, and Functional mobility training  Patient / Family readiness to learn indicated by: asking questions  Persons(s) to be included in education: patient (P)  Barriers to Learning/Limitations: None  Patient Goal (s): a way to make back feel better  Patient Self Reported Health Status: good  Rehabilitation Potential: good    Short Term Goals: To be accomplished in 1 treatments:  Pt will demonstrate understanding of appropriate abdominal and gluteal exercises to reduce chronicity of LBP. Eval: MET    Patient/ Caregiver education and instruction: Diagnosis, prognosis, self care, activity modification, and exercises   [x]  Plan of care has been reviewed with DIPTI Finley, TANYA 9/20/2022 4:05 PM    ________________________________________________________________________    I certify that the above Therapy Services are being furnished while the patient is under my care. I agree with the treatment plan and certify that this therapy is necessary.     [de-identified] Signature:____________Date:_________TIME:________     Sarai Romeo MD  ** Signature, Date and Time must be completed for valid certification **    Please sign and return to [facility] [address] [place] [address] & [facility]  [address]  [place], [address]  [phone] fax

## 2022-11-02 ENCOUNTER — TELEPHONE (OUTPATIENT)
Dept: ORTHOPEDIC SURGERY | Age: 37
End: 2022-11-02

## 2023-06-30 ENCOUNTER — OFFICE VISIT (OUTPATIENT)
Age: 38
End: 2023-06-30
Payer: COMMERCIAL

## 2023-06-30 VITALS
OXYGEN SATURATION: 99 % | HEIGHT: 66 IN | BODY MASS INDEX: 29.73 KG/M2 | TEMPERATURE: 97.6 F | DIASTOLIC BLOOD PRESSURE: 76 MMHG | SYSTOLIC BLOOD PRESSURE: 121 MMHG | WEIGHT: 185 LBS | HEART RATE: 64 BPM

## 2023-06-30 DIAGNOSIS — M54.50 LOW BACK PAIN, UNSPECIFIED BACK PAIN LATERALITY, UNSPECIFIED CHRONICITY, UNSPECIFIED WHETHER SCIATICA PRESENT: ICD-10-CM

## 2023-06-30 DIAGNOSIS — M54.12 CERVICAL NEURITIS: Primary | ICD-10-CM

## 2023-06-30 PROCEDURE — 99214 OFFICE O/P EST MOD 30 MIN: CPT | Performed by: PHYSICAL MEDICINE & REHABILITATION

## 2023-06-30 RX ORDER — TOPIRAMATE 25 MG/1
75 TABLET ORAL NIGHTLY
Qty: 90 TABLET | Refills: 1 | Status: SHIPPED | OUTPATIENT
Start: 2023-06-30

## 2024-05-30 ENCOUNTER — OFFICE VISIT (OUTPATIENT)
Age: 39
End: 2024-05-30
Payer: COMMERCIAL

## 2024-05-30 VITALS
TEMPERATURE: 98.2 F | BODY MASS INDEX: 30.86 KG/M2 | WEIGHT: 192 LBS | DIASTOLIC BLOOD PRESSURE: 84 MMHG | RESPIRATION RATE: 18 BRPM | HEIGHT: 66 IN | HEART RATE: 60 BPM | SYSTOLIC BLOOD PRESSURE: 126 MMHG | OXYGEN SATURATION: 96 %

## 2024-05-30 DIAGNOSIS — M51.36 DDD (DEGENERATIVE DISC DISEASE), LUMBAR: ICD-10-CM

## 2024-05-30 DIAGNOSIS — M47.816 LUMBAR SPONDYLOSIS: Primary | ICD-10-CM

## 2024-05-30 DIAGNOSIS — M43.06 PARS DEFECT OF LUMBAR SPINE: ICD-10-CM

## 2024-05-30 DIAGNOSIS — M43.16 SPONDYLOLISTHESIS OF LUMBAR REGION: ICD-10-CM

## 2024-05-30 PROCEDURE — 99214 OFFICE O/P EST MOD 30 MIN: CPT | Performed by: PHYSICAL MEDICINE & REHABILITATION

## 2024-05-30 PROCEDURE — 72110 X-RAY EXAM L-2 SPINE 4/>VWS: CPT | Performed by: PHYSICAL MEDICINE & REHABILITATION

## 2024-05-30 RX ORDER — METHYLPREDNISOLONE 4 MG/1
TABLET ORAL
Qty: 1 KIT | Refills: 0 | Status: SHIPPED | OUTPATIENT
Start: 2024-05-30

## 2024-05-30 RX ORDER — NAPROXEN 500 MG/1
500 TABLET ORAL 2 TIMES DAILY WITH MEALS
Qty: 30 TABLET | Refills: 0 | Status: SHIPPED | OUTPATIENT
Start: 2024-05-30

## 2024-05-30 RX ORDER — AZELASTINE 1 MG/ML
SPRAY, METERED NASAL
COMMUNITY
Start: 2024-03-25

## 2024-05-30 NOTE — PROGRESS NOTES
[facility]  [address] [facility] [address]  [place] [id number]  Tel: [phone]  Fax: [phone]          PROGRESS NOTE      HISTORY OF PRESENT ILLNESS:  The patient is a 38 y.o. female and was seen today c/o localized low back pain, x 2 weeks w/o injury. Previously seen for follow up of acute, intermittent increase in right lower quadrant pain and LLE pain in a L4 distribution to the knee. Previously seen for low back pain radiating into the RLE in a L4 distribution to the knee. Previously seen for intermittent low back pain radiating into  the BLE in an L4 distribution to the knees.   She was initially seen with intermittent low back pain radiating into the RLE in an L4 distribution to the knee. Pain is exacerbated with  prolonged positions. Denies specific injury or trauma. Denies hx of spinal surgery. She treats with NSAIDs prn with some relief. She reports a hx of PT and she is not completing her HEP.  Pt did not tolerate CYMBALTA 30 mg daily, as it was causing her do have decreased appetite. Pt underwent L3-4 epidural on 2/28/19 with significant relief. Patient denies recent fevers, weight loss, rashes or skin sores. No stomach ulcers or bleeding disorders. Pt denies change in  bowel or bladder habits. She denies possibility of pregnancy  or breastfeeding (birth control). The patient is  RHD. PmHx of migraine headaches, tubal  ligation (4/2022).Last seen by me 7/2015 with c/o left-sided low back pain and right hip pain. Had an MRI of the right hip. Scheduled to f/u in 1 month and failed to do so.  Lumbar spine MRI dated 6/29/15  reviewed. Per report, there is a mild anterolisthesis of L5-S1 with broad-based disc protrusion, central annular tear and subtle caudal extrusion as a result of the listhesis  but without significant impression on the ventral thecal sac or canal stenosis. Moderate bilateral foraminal stenosis from foraminal portions of the protrusion. Focal

## 2024-06-15 DIAGNOSIS — M51.36 DDD (DEGENERATIVE DISC DISEASE), LUMBAR: ICD-10-CM

## 2024-06-15 DIAGNOSIS — M47.816 LUMBAR SPONDYLOSIS: ICD-10-CM

## 2024-06-15 DIAGNOSIS — M43.06 PARS DEFECT OF LUMBAR SPINE: ICD-10-CM

## 2024-06-15 DIAGNOSIS — M43.16 SPONDYLOLISTHESIS OF LUMBAR REGION: ICD-10-CM

## 2024-06-17 RX ORDER — NAPROXEN 500 MG/1
500 TABLET ORAL 2 TIMES DAILY WITH MEALS
Qty: 30 TABLET | Refills: 0 | OUTPATIENT
Start: 2024-06-17

## 2024-06-25 ENCOUNTER — HOSPITAL ENCOUNTER (OUTPATIENT)
Facility: HOSPITAL | Age: 39
Setting detail: RECURRING SERIES
Discharge: HOME | End: 2024-06-28
Payer: COMMERCIAL

## 2024-06-25 PROCEDURE — 97535 SELF CARE MNGMENT TRAINING: CPT

## 2024-06-25 PROCEDURE — 97161 PT EVAL LOW COMPLEX 20 MIN: CPT

## 2024-06-25 PROCEDURE — 97530 THERAPEUTIC ACTIVITIES: CPT

## 2024-06-25 NOTE — PROGRESS NOTES
POC    Patient will continue to benefit from skilled PT / OT services to modify and progress therapeutic interventions, analyze and address functional mobility deficits, analyze and address ROM deficits, analyze and address strength deficits, analyze and address soft tissue restrictions, analyze and cue for proper movement patterns, and analyze and modify for postural abnormalities to address functional deficits and attain remaining goals.    Progress toward goals / Updated goals:  [x]  See POC    Short Term Goals: To be accomplished in  1-2  weeks:  1. Independent with HEP.  EVAL: N/A  2. Eliminate radicular symptoms to anterior thighs when flying.  EVAL: numbness in anterior thigh with sitting for prolonged periods like flying    Long Term Goals: To be accomplished in  4-6  weeks:  1.  Decrease max pain to 1/10 to assist with being able to squat and workout without flaring up low back pain.  EVAL: 8/10 max pain  2.  Improve Oswestry Score by 20% points in order to show significant functional improvement.  EVAL: 40%  3.  Decrease max pain to < 2/10 to assist with being able to walk around base without significant back pain.  EVAL: 8/10 max pain    Frequency / Duration: Patient to be seen  1-2 times per week for 4-6  weeks:     Next PN due 7/25/24  Auth due 30 visits 6/30/24    PLAN  yes Continue plan of care  []  Other:      Halel Bermudez PT    6/25/2024    5:24 PM    Future Appointments   Date Time Provider Department Center   7/2/2024  3:30 PM Halle Bermudez PT [facility]   7/5/2024  7:30 AM Jose, Crystal, PTA [facility]   7/9/2024  3:30 PM Halle Bermudez PT [facility]   7/11/2024  3:30 PM Jose, Crystal, PTA [facility]   7/16/2024  3:30 PM Halle Bermudez PT [facility]   7/18/2024  3:30 PM Jose, Crystal, PTA [facility]   7/24/2024  3:30 PM Halle Bermudez, PT [facility]   7/29/2024  3:30 PM Jose, Crystal, PTA [facility]   8/1/2024 11:45 AM Bill Borrego MD [facility]

## 2024-06-25 NOTE — THERAPY EVALUATION
(L3,4) 4+ 4+   Ankle Dorsiflexion (L4) 4+ 4+   Great Toe Extension (L5) 4+ 4+   Ankle Plantarflexion (S1) 4+ 4+   Knee Flexion (S1,2) 4+ 4+     Special Tests  Lumbar:  Lumb. Compression: [] Pos  [x] Neg               Lumbar Distraction:   [] Pos  [x] Neg    Quadrant:  [] Pos  [x] Neg   [] Flex  [] Ext      Deficits:            Randal: [x] R    [x] L    [x] +    [] -     Hamstrings SLR:  60 degs    Gastrocsoleus (to neutral): Right: tight Left: tight       Global Muscular Weakness:  Abdominals: deep core weakness    ASSESSMENT/Changes in Function: Pt presents with L5 lumbar spondylosis and would benefit from skilled PT to address ROM, strength, and flexibility impairments to decrease pain and improve functional mobility.     Patient will continue to benefit from skilled PT services to modify and progress therapeutic interventions, analyze and address functional mobility deficits, analyze and address ROM deficits, analyze and address strength deficits, analyze and address soft tissue restrictions, analyze and cue for proper movement patterns, and analyze and modify for postural abnormalities to address functional deficits and attain remaining goals.      PLAN    Short Term Goals: To be accomplished in  1-2  weeks:  1. Independent with HEP.  EVAL: N/A  2. Eliminate radicular symptoms to anterior thighs when flying.  EVAL: numbness in anterior thigh with sitting for prolonged periods like flying    Long Term Goals: To be accomplished in  4-6  weeks:  1.  Decrease max pain to 1/10 to assist with being able to squat and workout without flaring up low back pain.  EVAL: 8/10 max pain  2.  Improve Oswestry Score by 20% points in order to show significant functional improvement.  EVAL: 40%  3.  Decrease max pain to < 2/10 to assist with being able to walk around base without significant back pain.  EVAL: 8/10 max pain    Frequency / Duration:   Patient to be seen  1-2 times per week for 4-6  weeks:     Patient/ Caregiver

## 2024-07-02 ENCOUNTER — HOSPITAL ENCOUNTER (OUTPATIENT)
Facility: HOSPITAL | Age: 39
Setting detail: RECURRING SERIES
Discharge: HOME | End: 2024-07-05
Payer: COMMERCIAL

## 2024-07-02 PROCEDURE — 97110 THERAPEUTIC EXERCISES: CPT

## 2024-07-02 PROCEDURE — 97112 NEUROMUSCULAR REEDUCATION: CPT

## 2024-07-02 PROCEDURE — 97530 THERAPEUTIC ACTIVITIES: CPT

## 2024-07-02 NOTE — PROGRESS NOTES
units; 68-82 min = 5 units   Total Total     [x]  Patient Education billed concurrently with other procedures   [x] Review HEP    [] Progressed/Changed HEP, detail:    [] Other detail:       Objective Information/Functional Measures/Assessment    Pt tolerated addition of shoulder extension with ab draw, resisted side stepping, paloff press, RDLs, planks, and multifidus hip lifts to work on core and glute strengthening to help support lumbopelvic region. RDLs were done against wall to work on proper form and to cue hips to push back as knees unlock. VC given to make sure she is firing her glues to promote hip extension back to starting position. Pt also needed some vc and tactile cues to make sure that she was doing the ab draw correctly with the right muscle activation.    Patient will continue to benefit from skilled PT / OT services to modify and progress therapeutic interventions, analyze and address functional mobility deficits, analyze and address ROM deficits, analyze and address strength deficits, analyze and address soft tissue restrictions, analyze and cue for proper movement patterns, and analyze and modify for postural abnormalities to address functional deficits and attain remaining goals.    Progress toward goals / Updated goals:  [x]  See POC    Short Term Goals: To be accomplished in  1-2  weeks:  1. Independent with HEP.  EVAL: N/A  2. Eliminate radicular symptoms to anterior thighs when flying.  EVAL: numbness in anterior thigh with sitting for prolonged periods like flying    Long Term Goals: To be accomplished in  4-6  weeks:  1.  Decrease max pain to 1/10 to assist with being able to squat and workout without flaring up low back pain.  EVAL: 8/10 max pain  2.  Improve Oswestry Score by 20% points in order to show significant functional improvement.  EVAL: 40%  3.  Decrease max pain to < 2/10 to assist with being able to walk around base without significant back pain.  EVAL: 8/10 max

## 2024-07-09 ENCOUNTER — HOSPITAL ENCOUNTER (OUTPATIENT)
Facility: HOSPITAL | Age: 39
Setting detail: RECURRING SERIES
Discharge: HOME | End: 2024-07-12
Payer: COMMERCIAL

## 2024-07-09 PROCEDURE — 97530 THERAPEUTIC ACTIVITIES: CPT

## 2024-07-09 PROCEDURE — 97112 NEUROMUSCULAR REEDUCATION: CPT

## 2024-07-09 PROCEDURE — 97110 THERAPEUTIC EXERCISES: CPT

## 2024-07-09 NOTE — PROGRESS NOTES
PHYSICAL / OCCUPATIONAL THERAPY - DAILY TREATMENT NOTE (updated )      Patient Name: Rony Stringer    Date: 2024    : 1985  Insurance: Payor: BCBS / Plan: BCBS OUT OF STATE / Product Type: *No Product type* /      Patient  verified yes     Visit #   Current / Total 3 12   Time   In / Out 3:35 4:15   Pain   In / Out 1 0   Subjective Functional Status/Changes: Pt reports that her back ok today today she is not really having any back pain.      TREATMENT AREA =  Other low back pain [M54.59]  Other chronic pain [G89.29]    OBJECTIVE      Therapeutic Procedures:    Tx Min Billable or 1:1 Min (if diff from Tx Min) Procedure, Rationale, Specifics        15  85987 Neuromuscular Re-Education (timed):  improve balance, coordination, kinesthetic sense, posture, core stability and proprioception to improve patient's ability to develop conscious control of individual muscles and awareness of position of extremities in order to progress to PLOF and address remaining functional goals. (see flow sheet as applicable)     Details if applicable:            15  19371 Therapeutic Activity (timed):  use of dynamic activities replicating functional movements to increase ROM, strength, coordination, balance, and proprioception in order to improve patient's ability to progress to PLOF and address remaining functional goals.  (see flow sheet as applicable)     Details if applicable:            10  15040 Therapeutic Exercise (timed):  increase ROM, strength, coordination, balance, and proprioception to improve patient's ability to progress to PLOF and address remaining functional goals. (see flow sheet as applicable)      Details if applicable:            Details if applicable:     40  [facility] Totals Reminder: bill using total billable min of TIMED therapeutic procedures (example: do not include dry needle or estim unattended, both untimed codes, in totals to left)  8-22 min = 1 unit; 23-37 min = 2 units; 38-52 min =

## 2024-07-11 ENCOUNTER — APPOINTMENT (OUTPATIENT)
Facility: HOSPITAL | Age: 39
End: 2024-07-11
Payer: COMMERCIAL

## 2024-07-16 ENCOUNTER — HOSPITAL ENCOUNTER (OUTPATIENT)
Facility: HOSPITAL | Age: 39
Setting detail: RECURRING SERIES
Discharge: HOME | End: 2024-07-19
Payer: COMMERCIAL

## 2024-07-16 PROCEDURE — 97112 NEUROMUSCULAR REEDUCATION: CPT

## 2024-07-16 PROCEDURE — 97530 THERAPEUTIC ACTIVITIES: CPT

## 2024-07-16 PROCEDURE — 97110 THERAPEUTIC EXERCISES: CPT

## 2024-07-16 NOTE — PROGRESS NOTES
PHYSICAL / OCCUPATIONAL THERAPY - DAILY TREATMENT NOTE (updated )      Patient Name: Rony Stringer    Date: 2024    : 1985  Insurance: Payor: BCBS / Plan: BCBS OUT OF STATE / Product Type: *No Product type* /      Patient  verified yes     Visit #   Current / Total 4 12   Time   In / Out 3:34 4:12   Pain   In / Out 0 0   Subjective Functional Status/Changes: Pt reports that she is not having any back pain today.     TREATMENT AREA =  Other low back pain [M54.59]  Other chronic pain [G89.29]    OBJECTIVE    Therapeutic Procedures:    Tx Min Billable or 1:1 Min (if diff from Tx Min) Procedure, Rationale, Specifics        13  10192 Neuromuscular Re-Education (timed):  improve balance, coordination, kinesthetic sense, posture, core stability and proprioception to improve patient's ability to develop conscious control of individual muscles and awareness of position of extremities in order to progress to PLOF and address remaining functional goals. (see flow sheet as applicable)     Details if applicable:            15  83200 Therapeutic Activity (timed):  use of dynamic activities replicating functional movements to increase ROM, strength, coordination, balance, and proprioception in order to improve patient's ability to progress to PLOF and address remaining functional goals.  (see flow sheet as applicable)     Details if applicable:            10  55429 Therapeutic Exercise (timed):  increase ROM, strength, coordination, balance, and proprioception to improve patient's ability to progress to PLOF and address remaining functional goals. (see flow sheet as applicable)      Details if applicable:            Details if applicable:     38  [facility] Totals Reminder: bill using total billable min of TIMED therapeutic procedures (example: do not include dry needle or estim unattended, both untimed codes, in totals to left)  8-22 min = 1 unit; 23-37 min = 2 units; 38-52 min = 3 units; 53-67 min = 4 
.

## 2024-07-24 ENCOUNTER — HOSPITAL ENCOUNTER (OUTPATIENT)
Facility: HOSPITAL | Age: 39
Setting detail: RECURRING SERIES
Discharge: HOME | End: 2024-07-27
Payer: COMMERCIAL

## 2024-07-24 PROCEDURE — 97110 THERAPEUTIC EXERCISES: CPT

## 2024-07-24 PROCEDURE — 97112 NEUROMUSCULAR REEDUCATION: CPT

## 2024-07-24 PROCEDURE — 97530 THERAPEUTIC ACTIVITIES: CPT

## 2024-07-24 NOTE — THERAPY RECERTIFICATION
[facility]  [address], [address], [place] [address]  Ph:[phone] Fx:[phone]  PHYSICAL THERAPY PROGRESS NOTE  Patient Name: Rony Stringer : 1985   Treatment/Medical Diagnosis: Other low back pain [M54.59]  Other chronic pain [G89.29]   Referral Source: Bill Borrego MD     Date of Initial Visit: 24 Attended Visits: 5 Missed Visits: 3     SUMMARY OF TREATMENT  Ms. Stringer has been seen for initial eval and 4 follow treatments and has shown good progress with PT. Pt reports 15% improvement. Oswestry has decreased from 40 to 20 which shows improved in functional mobility. Pain level on average is 2/10. Pt reports she is able to perform normal household chores and work duties if she doesn't have any pain. Pt has difficulty when sitting for long period which causes pain at LB and radicular symptoms at  (B)thighs.    CURRENT STATUS  1. Independent with HEP.  EVAL: N/A  Current:  1x day  24  2. Eliminate radicular symptoms to anterior thighs when flying.  EVAL: numbness in anterior thigh with sitting for prolonged periods like flying  Current:haven't had any radicular symptoms while seated/haven't flew recently     Long Term Goals: To be accomplished in  4-6  weeks:  1.  Decrease max pain to 1/10 to assist with being able to squat and workout without flaring up low back pain.  EVAL: 8/10 max pain  Current:3/10  24 not met  2.  Improve Oswestry Score by 20% points in order to show significant functional improvement.  EVAL: 40%  Current: 20%  met  3.  Decrease max pain to < 2/10 to assist with being able to walk around base without significant back pain.  EVAL: 8/10 max pain  Current: 1/10  met      Functional Gains: pt is able to perform normal chores and work duties if she doesn't have any pain   Functional Deficits: sitting for long period cause pain at LB and radicular symptoms at (B) thighs  % improvement: 15  Pain

## 2024-07-24 NOTE — PROGRESS NOTES
PHYSICAL / OCCUPATIONAL THERAPY - DAILY TREATMENT NOTE    Patient Name: Rony Stringer    Date: 2024    : 1985  Insurance: Payor: BCBS / Plan: BCBS OUT OF STATE / Product Type: *No Product type* /      Patient  verified Yes     Visit #   Current / Total 5 12   Time   In / Out 3:30 4:12   Pain   In / Out 0 0   Subjective Functional Status/Changes: \"No pain right now.\"     TREATMENT AREA =  Other low back pain [M54.59]  Other chronic pain [G89.29]     OBJECTIVE         Therapeutic Procedures:    Tx Min Billable or 1:1 Min (if diff from Tx Min) Procedure, Rationale, Specifics   14  [id number] Therapeutic Exercise (timed):  increase ROM, strength, coordination, balance, and proprioception to improve patient's ability to progress to PLOF and address remaining functional goals. (see flow sheet as applicable)     Details if applicable:       20  49485 Therapeutic Activity (timed):  use of dynamic activities replicating functional movements to increase ROM, strength, coordination, balance, and proprioception in order to improve patient's ability to progress to PLOF and address remaining functional goals.  (see flow sheet as applicable)     Details if applicable:  REASSESS GOALS/FOTO   8  90744 Neuromuscular Re-Education (timed):  improve balance, coordination, kinesthetic sense, posture, core stability and proprioception to improve patient's ability to develop conscious control of individual muscles and awareness of position of extremities in order to progress to PLOF and address remaining functional goals. (see flow sheet as applicable)     Details if applicable:            Details if applicable:            Details if applicable:     42  [facility] Totals Reminder: bill using total billable min of TIMED therapeutic procedures (example: do not include dry needle or estim unattended, both untimed codes, in totals to left)  8-22 min = 1 unit; 23-37 min = 2 units; 38-52 min = 3 units; 53-67 min = 4 units; 68-82

## 2024-08-21 NOTE — PROGRESS NOTES
Written by Stephanie Gonzalez medical scribe, as dictated by Bill Borrego MD  I examined the patient, reviewed and agree with the note.

## 2024-08-22 ENCOUNTER — OFFICE VISIT (OUTPATIENT)
Age: 39
End: 2024-08-22
Payer: COMMERCIAL

## 2024-08-22 VITALS — BODY MASS INDEX: 31.18 KG/M2 | WEIGHT: 194 LBS | TEMPERATURE: 98 F | HEIGHT: 66 IN

## 2024-08-22 DIAGNOSIS — M47.816 LUMBAR SPONDYLOSIS: Primary | ICD-10-CM

## 2024-08-22 DIAGNOSIS — M51.36 DDD (DEGENERATIVE DISC DISEASE), LUMBAR: ICD-10-CM

## 2024-08-22 DIAGNOSIS — M43.06 PARS DEFECT OF LUMBAR SPINE: ICD-10-CM

## 2024-08-22 DIAGNOSIS — M43.16 SPONDYLOLISTHESIS OF LUMBAR REGION: ICD-10-CM

## 2024-08-22 PROCEDURE — 99214 OFFICE O/P EST MOD 30 MIN: CPT | Performed by: PHYSICAL MEDICINE & REHABILITATION

## 2024-08-30 ENCOUNTER — HOSPITAL ENCOUNTER (OUTPATIENT)
Facility: HOSPITAL | Age: 39
Discharge: HOME | End: 2024-08-30
Attending: PHYSICAL MEDICINE & REHABILITATION
Payer: COMMERCIAL

## 2024-08-30 DIAGNOSIS — M43.06 PARS DEFECT OF LUMBAR SPINE: ICD-10-CM

## 2024-08-30 DIAGNOSIS — M51.36 DDD (DEGENERATIVE DISC DISEASE), LUMBAR: ICD-10-CM

## 2024-08-30 DIAGNOSIS — M47.816 LUMBAR SPONDYLOSIS: ICD-10-CM

## 2024-08-30 DIAGNOSIS — M43.16 SPONDYLOLISTHESIS OF LUMBAR REGION: ICD-10-CM

## 2024-08-30 PROCEDURE — 72148 MRI LUMBAR SPINE W/O DYE: CPT

## 2024-09-13 ENCOUNTER — OFFICE VISIT (OUTPATIENT)
Age: 39
End: 2024-09-13
Payer: COMMERCIAL

## 2024-09-13 VITALS
BODY MASS INDEX: 31.18 KG/M2 | TEMPERATURE: 98 F | OXYGEN SATURATION: 100 % | HEART RATE: 75 BPM | HEIGHT: 66 IN | WEIGHT: 194 LBS

## 2024-09-13 DIAGNOSIS — S29.019A ACUTE THORACIC MYOFASCIAL STRAIN, INITIAL ENCOUNTER: ICD-10-CM

## 2024-09-13 DIAGNOSIS — M48.061 FORAMINAL STENOSIS OF LUMBAR REGION: ICD-10-CM

## 2024-09-13 DIAGNOSIS — M47.819 FACET ARTHROPATHY: ICD-10-CM

## 2024-09-13 DIAGNOSIS — M51.24 PROTRUSION OF THORACIC INTERVERTEBRAL DISC: Primary | ICD-10-CM

## 2024-09-13 PROCEDURE — 99214 OFFICE O/P EST MOD 30 MIN: CPT | Performed by: PHYSICAL MEDICINE & REHABILITATION

## 2024-11-13 NOTE — PROGRESS NOTES
that overall her pain is worse when compared to the last office visit. She reports a flare of lower back and BLE pain x1 week due to no specific injury. She is non compliant with her daily HEP, she does them x2-3 per week. She denies changes in bowel or bladder habits. She denies taking any medication for her pain.     reviewed. Body mass index is 30.34 kg/m².    PCP: Jessica Limon MD      Past Medical History:   Diagnosis Date    Allergic rhinitis     Eczema     HPV (human papilloma virus) infection     Ill-defined condition     DESIRES STERILIZATION    Migraines        Social History     Socioeconomic History    Marital status:      Spouse name: None    Number of children: None    Years of education: None    Highest education level: None   Tobacco Use    Smoking status: Never    Smokeless tobacco: Never   Substance and Sexual Activity    Alcohol use: Yes    Drug use: No       Current Outpatient Medications   Medication Sig Dispense Refill    azelastine (ASTELIN) 0.1 % nasal spray USE 2 SPRAYS IN EACH NOSTRIL TWICE DAILY      fluticasone (FLONASE) 50 MCG/ACT nasal spray 2 sprays by Nasal route daily as needed      methylPREDNISolone (MEDROL DOSEPACK) 4 MG tablet Follow package directions (Patient not taking: Reported on 8/22/2024) 1 kit 0    naproxen (NAPROSYN) 500 MG tablet Take 1 tablet by mouth 2 times daily (with meals) (Patient not taking: Reported on 8/22/2024) 30 tablet 0    topiramate (TOPAMAX) 25 MG tablet Take 3 tablets by mouth nightly (Patient not taking: Reported on 5/30/2024) 90 tablet 1    loratadine (CLARITIN) 10 MG tablet Take 1 tablet by mouth daily as needed (Patient not taking: Reported on 5/30/2024)      methylPREDNISolone (MEDROL DOSEPACK) 4 MG tablet Per dose pack instructions (Patient not taking: Reported on 5/30/2024)      naproxen (NAPROSYN) 500 MG tablet Take 1 tablet by mouth 2 times daily (with meals) (Patient not taking: Reported on 5/30/2024)       No current

## 2024-11-14 ENCOUNTER — OFFICE VISIT (OUTPATIENT)
Age: 39
End: 2024-11-14
Payer: COMMERCIAL

## 2024-11-14 VITALS — BODY MASS INDEX: 30.22 KG/M2 | WEIGHT: 188 LBS | HEIGHT: 66 IN

## 2024-11-14 DIAGNOSIS — M48.061 FORAMINAL STENOSIS OF LUMBAR REGION: ICD-10-CM

## 2024-11-14 DIAGNOSIS — S29.019A ACUTE THORACIC MYOFASCIAL STRAIN, INITIAL ENCOUNTER: Primary | ICD-10-CM

## 2024-11-14 DIAGNOSIS — M47.819 FACET ARTHROPATHY: ICD-10-CM

## 2024-11-14 DIAGNOSIS — M51.24 PROTRUSION OF THORACIC INTERVERTEBRAL DISC: ICD-10-CM

## 2024-11-14 PROCEDURE — 99214 OFFICE O/P EST MOD 30 MIN: CPT | Performed by: PHYSICAL MEDICINE & REHABILITATION

## 2024-11-20 ENCOUNTER — CLINICAL DOCUMENTATION (OUTPATIENT)
Age: 39
End: 2024-11-20

## 2024-11-20 NOTE — PROGRESS NOTES
Left voicemail requesting return call to schedule injection at [facility] with Dr. Borrego.

## 2024-11-21 ENCOUNTER — HOSPITAL ENCOUNTER (OUTPATIENT)
Facility: HOSPITAL | Age: 39
End: 2024-11-21
Payer: COMMERCIAL

## 2024-11-21 VITALS
OXYGEN SATURATION: 95 % | RESPIRATION RATE: 17 BRPM | HEART RATE: 64 BPM | SYSTOLIC BLOOD PRESSURE: 142 MMHG | DIASTOLIC BLOOD PRESSURE: 73 MMHG | TEMPERATURE: 98.6 F

## 2024-11-21 PROBLEM — S29.019A STRAIN OF THORACIC REGION: Status: ACTIVE | Noted: 2024-11-21

## 2024-11-21 LAB — HCG UR QL: NEGATIVE

## 2024-11-21 PROCEDURE — 6360000002 HC RX W HCPCS: Performed by: PHYSICAL MEDICINE & REHABILITATION

## 2024-11-21 PROCEDURE — 81025 URINE PREGNANCY TEST: CPT

## 2024-11-21 PROCEDURE — 64483 NJX AA&/STRD TFRM EPI L/S 1: CPT | Performed by: PHYSICAL MEDICINE & REHABILITATION

## 2024-11-21 PROCEDURE — 6370000000 HC RX 637 (ALT 250 FOR IP): Performed by: PHYSICAL MEDICINE & REHABILITATION

## 2024-11-21 PROCEDURE — 64483 NJX AA&/STRD TFRM EPI L/S 1: CPT

## 2024-11-21 PROCEDURE — 2500000003 HC RX 250 WO HCPCS: Performed by: PHYSICAL MEDICINE & REHABILITATION

## 2024-11-21 PROCEDURE — 6360000004 HC RX CONTRAST MEDICATION: Performed by: PHYSICAL MEDICINE & REHABILITATION

## 2024-11-21 RX ORDER — DEXAMETHASONE SODIUM PHOSPHATE 10 MG/ML
10 INJECTION, SOLUTION INTRAMUSCULAR; INTRAVENOUS ONCE
Status: COMPLETED | OUTPATIENT
Start: 2024-11-21 | End: 2024-11-21

## 2024-11-21 RX ORDER — DIAZEPAM 5 MG/1
2.5 TABLET ORAL ONCE
Status: COMPLETED | OUTPATIENT
Start: 2024-11-21 | End: 2024-11-21

## 2024-11-21 RX ORDER — IOPAMIDOL 408 MG/ML
4 INJECTION, SOLUTION INTRATHECAL
Status: COMPLETED | OUTPATIENT
Start: 2024-11-21 | End: 2024-11-21

## 2024-11-21 RX ORDER — LIDOCAINE HYDROCHLORIDE 10 MG/ML
30 INJECTION, SOLUTION EPIDURAL; INFILTRATION; INTRACAUDAL; PERINEURAL ONCE
Status: COMPLETED | OUTPATIENT
Start: 2024-11-21 | End: 2024-11-21

## 2024-11-21 RX ORDER — DIAZEPAM 5 MG/1
10 TABLET ORAL ONCE
Status: COMPLETED | OUTPATIENT
Start: 2024-11-21 | End: 2024-11-21

## 2024-11-21 RX ORDER — DIAZEPAM 5 MG/1
5 TABLET ORAL ONCE
Status: COMPLETED | OUTPATIENT
Start: 2024-11-21 | End: 2024-11-21

## 2024-11-21 RX ADMIN — IOPAMIDOL 2 ML: 408 INJECTION, SOLUTION INTRATHECAL at 14:10

## 2024-11-21 RX ADMIN — LIDOCAINE HYDROCHLORIDE 30 ML: 10 INJECTION, SOLUTION EPIDURAL; INFILTRATION; INTRACAUDAL; PERINEURAL at 14:09

## 2024-11-21 RX ADMIN — DEXAMETHASONE SODIUM PHOSPHATE 10 MG: 10 INJECTION, SOLUTION INTRAMUSCULAR; INTRAVENOUS at 14:10

## 2024-11-21 RX ADMIN — DIAZEPAM 10 MG: 5 TABLET ORAL at 13:08

## 2024-11-21 ASSESSMENT — PAIN DESCRIPTION - DESCRIPTORS: DESCRIPTORS: ACHING;SHARP

## 2024-11-21 ASSESSMENT — PAIN SCALES - GENERAL: PAINLEVEL_OUTOF10: 0

## 2024-11-21 ASSESSMENT — PAIN - FUNCTIONAL ASSESSMENT: PAIN_FUNCTIONAL_ASSESSMENT: 0-10

## 2024-11-21 NOTE — OP NOTE
PROCEDURE NOTE      Patient Name: Rony Stringer    Date of Procedure: November 21, 2024    Preoperative Diagnosis:  S29.019A    Post Operative Diagnosis:  same    Procedure :    bilateral L5 Selective Nerve Root Block      Consent:  Informed consent was obtained prior to the procedure.  The patient was given the opportunity to ask questions regarding the procedure and its associated risks.  In addition to the potential risks associated with the procedure itself, the patient was informed both verbally and in writing of the potential side effects of the use of glucocorticoid.  The patient appeared to comprehend the informed consent and desired to have the procedure performed.        Procedure:  The patient was placed in the prone position on the fluoroscopy table and the back was prepped and draped in the usual sterile manner.  The exact spinal level was  identified using fluoroscopy, and Lidocaine 1 % was injected locally, a # 22 gauge spinal needle was passed to the transverse process.  The depth was noted and the needle redirected to pass inferior and approximately one cm anterior to the transverse process.    Yes  1 cc of Isovue M-200 was used to verify positioning in the epidural and paravertebral space and outlined the course of the spinal nerve into the epidural space.  The same procedure was repeated at each spinal level indicated above.    A total of 10 mg of preservative free Dexamethasone and 2 cc of Lidocaine was slowly injected.   The patient tolerated the procedure well.  The injection area was cleaned and bandaids applied.  Not excessive bleeding was noted.   Patient dressed and discharged to home with instructions.    Discussion: The patient tolerated the procedure well.                                              ALE SAMPSON III, MD  November 21, 2024

## 2025-02-12 NOTE — PROGRESS NOTES
[facility]  [facility], [address]  [place] [address]  Phone: [phone]  Fax: [phone]      Rony Stringer  : 1985  PCP: None, None  2025    PROGRESS NOTE    HISTORY OF PRESENT ILLNESS  24 (Dr. Borrego)  C/o lower back pain with radicular symptoms into the BLE (R=L) extending laterally to the mid thigh.   Previously seen for acute midline thoracic spine pain located between her shoulder blades, she reports that her lower back pain has been resolved.   Previously seen for localized low back pain, w/o injury.   Pain is exacerbated with  prolonged positions.  She treats with NSAIDs prn with some relief.  She reports a hx of PT and she is not completing her HEP.    Pt did not tolerate CYMBALTA 30 mg daily, as it was causing her do have decreased appetite.   Pt underwent L3-4 epidural on 19 with significant relief.   Lumbar spine MRI dated 6/29/15  reviewed. Per report, there is a mild anterolisthesis of L5-S1 with broad-based disc protrusion, central annular tear and subtle caudal extrusion as a result of the listhesis  but without significant impression on the ventral thecal sac or canal stenosis. Moderate bilateral foraminal stenosis from foraminal portions of the protrusion. Focal central tear and extrusion at  L4-5 with impression on the ventral thecal sac and mild canal stenosis.  Exit foramina are patent. Remaining levels are normal.  No cord lesions  are seen.   Lumbar spine plain films dated 2022. 2 views: AP and lateral. Revealed: There appears to be a pars defect at L5. Disc space is well maintained. Grade 1 anterolisthesis of L5 on S1.   I will schedule a RUE EMG/NCV to rule out radiculopathy or carpal tunnel syndrome. I will try her on Topamax.   She is not currently taking Topamax.   Pt failed to follow up with PT secondary to resolved pain by the time her health insurance approved it.   I started her on a

## 2025-02-13 ENCOUNTER — OFFICE VISIT (OUTPATIENT)
Age: 40
End: 2025-02-13
Payer: COMMERCIAL

## 2025-02-13 VITALS
SYSTOLIC BLOOD PRESSURE: 124 MMHG | BODY MASS INDEX: 31.18 KG/M2 | TEMPERATURE: 98.2 F | DIASTOLIC BLOOD PRESSURE: 82 MMHG | WEIGHT: 194 LBS | HEART RATE: 68 BPM | HEIGHT: 66 IN

## 2025-02-13 DIAGNOSIS — M54.16 LUMBAR RADICULOPATHY: ICD-10-CM

## 2025-02-13 DIAGNOSIS — M47.816 LUMBAR FACET ARTHROPATHY: ICD-10-CM

## 2025-02-13 DIAGNOSIS — G89.29 CHRONIC PRIMARY MUSCULOSKELETAL PAIN: Primary | ICD-10-CM

## 2025-02-13 DIAGNOSIS — M79.18 CHRONIC PRIMARY MUSCULOSKELETAL PAIN: Primary | ICD-10-CM

## 2025-02-13 DIAGNOSIS — M54.50 LUMBAR PAIN: ICD-10-CM

## 2025-02-13 PROCEDURE — 99214 OFFICE O/P EST MOD 30 MIN: CPT | Performed by: PHYSICAL MEDICINE & REHABILITATION

## 2025-02-13 ASSESSMENT — ENCOUNTER SYMPTOMS
WHEEZING: 0
NAUSEA: 0
SHORTNESS OF BREATH: 0
TROUBLE SWALLOWING: 0
VOMITING: 0
BACK PAIN: 1

## 2025-02-13 NOTE — PATIENT INSTRUCTIONS
Adrián Freitas's Big three exercises link:  [email or web address]

## 2025-02-13 NOTE — PROGRESS NOTES
Rony Stringer presents today for   Chief Complaint   Patient presents with    Lower Back Pain       Is someone accompanying this pt? no    Is the patient using any DME equipment during OV? no    Coordination of Care:  1. Have you been to the ER, urgent care clinic since your last visit? no  Hospitalized since your last visit? no    2. Have you seen or consulted any other health care providers outside of the [facility] since your last visit? no Include any pap smears or colon screening. no

## (undated) DEVICE — (D)BNDG ADHESIVE FABRIC 3/4X3 -- DISC BY MFR USE ITEM 357960

## (undated) DEVICE — SYR 10ML LUER LOK 1/5ML GRAD --

## (undated) DEVICE — Device: Brand: MEDEX

## (undated) DEVICE — SET EPI 18GA L3.5IN TUOHY NDL W/ 20GA CLS TIP NYL CATH